# Patient Record
Sex: FEMALE | Race: WHITE
[De-identification: names, ages, dates, MRNs, and addresses within clinical notes are randomized per-mention and may not be internally consistent; named-entity substitution may affect disease eponyms.]

---

## 2017-04-05 ENCOUNTER — HOSPITAL ENCOUNTER (OUTPATIENT)
Dept: HOSPITAL 53 - M RAD | Age: 52
End: 2017-04-05
Attending: NURSE PRACTITIONER
Payer: COMMERCIAL

## 2017-04-05 DIAGNOSIS — S83.232A: Primary | ICD-10-CM
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
## 2017-04-05 DIAGNOSIS — M71.22: ICD-10-CM
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
## 2017-04-05 DIAGNOSIS — M25.462: ICD-10-CM

## 2017-04-05 NOTE — REP
MRI LEFT KNEE:

 

TECHNIQUE:  Axial proton density fat saturation, sagittal proton density T2 STIR,

water excitation, coronal proton density, proton density fat saturation.

 

There is a complex tear of the posterior horn of the medial meniscus.  The

lateral meniscus appears intact.  Cruciate and collateral ligaments are intact.

Extensor mechanism is intact.  There is moderate diffuse chondromalacia of the

patella with mild subchondral marrow edema in the lateral patellar facet. There

is moderate diffuse chondromalacia in the medial joint compartment.  There is no

occult fracture. There is a small joint effusion.  A septated lobulated popliteal

cyst is seen medially measuring approximately 6 cm in craniocaudal dimension, 2.9

cm AP dimension and 2.1 cm transversely.

 

IMPRESSION:

 

Complex tear posterior horn medial meniscus.  Cruciate and collateral ligaments

intact. 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  Small

joint effusion.  Popliteal cyst.

 

 

Signed by

James Guzmán MD 04/05/2017 05:39 P

## 2017-04-13 ENCOUNTER — HOSPITAL ENCOUNTER (OUTPATIENT)
Dept: HOSPITAL 53 - M SFHCLERA | Age: 52
End: 2017-04-13
Attending: NURSE PRACTITIONER
Payer: COMMERCIAL

## 2017-04-13 DIAGNOSIS — J06.9: Primary | ICD-10-CM

## 2017-05-25 ENCOUNTER — HOSPITAL ENCOUNTER (OUTPATIENT)
Dept: HOSPITAL 53 - M EKG | Age: 52
End: 2017-05-25
Attending: ORTHOPAEDIC SURGERY
Payer: COMMERCIAL

## 2017-05-25 DIAGNOSIS — I10: ICD-10-CM

## 2017-05-25 DIAGNOSIS — I44.0: ICD-10-CM

## 2017-05-25 DIAGNOSIS — Z01.818: Primary | ICD-10-CM

## 2017-05-26 NOTE — ECGEPIP
Stationary ECG Study

                              [facility]

                                       

                                       Test Date:    2017

Pat Name:     VASILE LUIS             Department:   

Patient ID:   [id number]                 Room:         -

Gender:       F                        Technician:   NEETA

:          1965               Requested By: Jong Ferraro 

Order Number: [id number]     Reading MD:   Umesh Briceño

                                 Measurements

Intervals                              Axis          

Rate:         62                       P:            59

TX:           219                      QRS:          7

QRSD:         96                       T:            47

QT:           422                                    

QTc:          431                                    

                           Interpretive Statements

SINUS RHYTHM WITH SINUS ARRHYTHMIA WITH FIRST DEGREE AV BLOCK

POSSIBLE LEFT ATRIAL ENLARGEMENT

Low voltages, poor R-wave progression.

No prior ECG available for comparison at the time of interpretation.

Electronically Signed On 2017 19:42:24 EDT by Umesh Briceño

## 2017-12-14 ENCOUNTER — HOSPITAL ENCOUNTER (OUTPATIENT)
Dept: HOSPITAL 53 - M SFHCPLAZ | Age: 52
End: 2017-12-14
Attending: NURSE PRACTITIONER
Payer: COMMERCIAL

## 2017-12-14 DIAGNOSIS — Z13.220: Primary | ICD-10-CM

## 2017-12-14 DIAGNOSIS — R40.0: ICD-10-CM

## 2017-12-14 DIAGNOSIS — R14.0: ICD-10-CM

## 2017-12-14 DIAGNOSIS — F32.2: ICD-10-CM

## 2017-12-14 DIAGNOSIS — R53.83: ICD-10-CM

## 2017-12-14 LAB
ALBUMIN SERPL BCG-MCNC: 4.1 GM/DL (ref 3.2–5.2)
ALBUMIN/GLOB SERPL: 1.11 {RATIO} (ref 1–1.93)
ALP SERPL-CCNC: 94 U/L (ref 45–117)
ALT SERPL W P-5'-P-CCNC: 29 U/L (ref 12–78)
ANION GAP SERPL CALC-SCNC: 6 MEQ/L (ref 8–16)
AST SERPL-CCNC: 18 U/L (ref 7–37)
BASOPHILS # BLD AUTO: 0 10^3/UL (ref 0–0.2)
BASOPHILS NFR BLD AUTO: 0.8 % (ref 0–1)
BILIRUB SERPL-MCNC: 0.5 MG/DL (ref 0.2–1)
BUN SERPL-MCNC: 20 MG/DL (ref 7–18)
CALCIUM SERPL-MCNC: 9.1 MG/DL (ref 8.5–10.1)
CHLORIDE SERPL-SCNC: 106 MEQ/L (ref 98–107)
CHOLEST SERPL-MCNC: 225 MG/DL (ref ?–200)
CO2 SERPL-SCNC: 30 MEQ/L (ref 21–32)
CREAT SERPL-MCNC: 0.85 MG/DL (ref 0.55–1.02)
EOSINOPHIL # BLD AUTO: 0.4 10^3/UL (ref 0–0.5)
EOSINOPHIL NFR BLD AUTO: 7.5 % (ref 0–3)
ERYTHROCYTE [DISTWIDTH] IN BLOOD BY AUTOMATED COUNT: 13.2 % (ref 11.5–14.5)
GFR SERPL CREATININE-BSD FRML MDRD: > 60 ML/MIN/{1.73_M2} (ref 51–?)
GLUCOSE SERPL-MCNC: 87 MG/DL (ref 70–105)
IMM GRANULOCYTES NFR BLD: 0.4 % (ref 0–0)
LYMPHOCYTES # BLD AUTO: 1.4 10^3/UL (ref 1.5–4.5)
LYMPHOCYTES NFR BLD AUTO: 26.6 % (ref 24–44)
MCH RBC QN AUTO: 31.8 PG (ref 27–33)
MCHC RBC AUTO-ENTMCNC: 33.3 G/DL (ref 32–36.5)
MCV RBC AUTO: 95.4 FL (ref 80–96)
MONOCYTES # BLD AUTO: 0.4 10^3/UL (ref 0–0.8)
MONOCYTES NFR BLD AUTO: 8.3 % (ref 0–5)
NEUTROPHILS # BLD AUTO: 2.9 10^3/UL (ref 1.8–7.7)
NEUTROPHILS NFR BLD AUTO: 56.4 % (ref 36–66)
NRBC BLD AUTO-RTO: 0 % (ref 0–0)
PLATELET # BLD AUTO: 271 10^3/UL (ref 150–450)
POTASSIUM SERPL-SCNC: 4.6 MEQ/L (ref 3.5–5.1)
PROT SERPL-MCNC: 7.8 GM/DL (ref 6.4–8.2)
SODIUM SERPL-SCNC: 142 MEQ/L (ref 136–145)
T4 FREE SERPL-MCNC: 0.88 NG/DL (ref 0.76–1.46)
TRIGL SERPL-MCNC: 72 MG/DL (ref ?–150)
WBC # BLD AUTO: 5.1 10^3/UL (ref 4–10)

## 2019-07-23 ENCOUNTER — HOSPITAL ENCOUNTER (OUTPATIENT)
Dept: HOSPITAL 53 - M SFHCPLAZ | Age: 54
End: 2019-07-23
Attending: NURSE PRACTITIONER
Payer: COMMERCIAL

## 2019-07-23 DIAGNOSIS — Z78.0: ICD-10-CM

## 2019-07-23 DIAGNOSIS — M25.50: ICD-10-CM

## 2019-07-23 DIAGNOSIS — R40.0: ICD-10-CM

## 2019-07-23 DIAGNOSIS — M35.1: ICD-10-CM

## 2019-07-23 DIAGNOSIS — Z13.220: ICD-10-CM

## 2019-07-23 DIAGNOSIS — Z86.19: ICD-10-CM

## 2019-07-23 DIAGNOSIS — E55.9: ICD-10-CM

## 2019-07-23 DIAGNOSIS — F32.2: ICD-10-CM

## 2019-07-23 DIAGNOSIS — R53.83: Primary | ICD-10-CM

## 2019-07-23 LAB
25(OH)D3 SERPL-MCNC: 29.6 NG/ML (ref 30–100)
ALBUMIN SERPL BCG-MCNC: 3.9 GM/DL (ref 3.2–5.2)
ALT SERPL W P-5'-P-CCNC: 23 U/L (ref 12–78)
BILIRUB SERPL-MCNC: 0.7 MG/DL (ref 0.2–1)
BUN SERPL-MCNC: 14 MG/DL (ref 7–18)
CALCIUM SERPL-MCNC: 9.4 MG/DL (ref 8.5–10.1)
CHLORIDE SERPL-SCNC: 106 MEQ/L (ref 98–107)
CHOLEST SERPL-MCNC: 224 MG/DL (ref ?–200)
CHOLEST/HDLC SERPL: 2.95 {RATIO} (ref ?–5)
CO2 SERPL-SCNC: 30 MEQ/L (ref 21–32)
CREAT SERPL-MCNC: 0.74 MG/DL (ref 0.55–1.3)
ERYTHROCYTE [SEDIMENTATION RATE] IN BLOOD BY WESTERGREN METHOD: 16 MM/HR (ref 0–30)
FSH SERPL-ACNC: 131.4 MIU/ML
GFR SERPL CREATININE-BSD FRML MDRD: > 60 ML/MIN/{1.73_M2} (ref 51–?)
GLUCOSE SERPL-MCNC: 82 MG/DL (ref 70–100)
HCT VFR BLD AUTO: 42.3 % (ref 36–47)
HDLC SERPL-MCNC: 76 MG/DL (ref 40–?)
HGB BLD-MCNC: 14.2 G/DL (ref 12–15.5)
IRON SATN MFR SERPL: 24.1 % (ref 13.2–45)
IRON SERPL-MCNC: 80 UG/DL (ref 50–170)
LDLC SERPL CALC-MCNC: 130 MG/DL (ref ?–100)
LH SERPL-ACNC: 53.3 MIU/ML
MCH RBC QN AUTO: 31.8 PG (ref 27–33)
MCHC RBC AUTO-ENTMCNC: 33.6 G/DL (ref 32–36.5)
MCV RBC AUTO: 94.8 FL (ref 80–96)
NONHDLC SERPL-MCNC: 148 MG/DL
PLATELET # BLD AUTO: 247 10^3/UL (ref 150–450)
POTASSIUM SERPL-SCNC: 4.4 MEQ/L (ref 3.5–5.1)
PROT SERPL-MCNC: 7.5 GM/DL (ref 6.4–8.2)
RBC # BLD AUTO: 4.46 10^6/UL (ref 4–5.4)
SODIUM SERPL-SCNC: 141 MEQ/L (ref 136–145)
TIBC SERPL-MCNC: 332 UG/DL (ref 250–450)
TRIGL SERPL-MCNC: 88 MG/DL (ref ?–150)
TSH SERPL DL<=0.005 MIU/L-ACNC: 3.42 UIU/ML (ref 0.36–3.74)
WBC # BLD AUTO: 6.8 10^3/UL (ref 4–10)

## 2019-07-24 LAB
HBV CORE IGM SER QL: NEGATIVE
HBV SURFACE AB SER-ACNC: NEGATIVE M[IU]/ML
HCV AB SER QL: 0.1 INDEX (ref ?–0.8)
HEPATITIS A ANTIBODY IGM: NEGATIVE